# Patient Record
Sex: MALE | URBAN - METROPOLITAN AREA
[De-identification: names, ages, dates, MRNs, and addresses within clinical notes are randomized per-mention and may not be internally consistent; named-entity substitution may affect disease eponyms.]

---

## 2021-07-21 ENCOUNTER — OFFICE VISIT (OUTPATIENT)
Dept: URBAN - METROPOLITAN AREA CLINIC 71 | Facility: CLINIC | Age: 41
End: 2021-07-21

## 2021-07-21 DIAGNOSIS — H02.811 RETAINED FOREIGN BODY IN RIGHT UPPER EYELID: Primary | ICD-10-CM

## 2021-07-21 PROCEDURE — 92002 INTRM OPH EXAM NEW PATIENT: CPT | Performed by: OPHTHALMOLOGY

## 2021-07-21 RX ORDER — NEOMYCIN SULFATE, POLYMYXIN B SULFATE AND DEXAMETHASONE 3.5; 10000; 1 MG/ML; [USP'U]/ML; MG/ML
SUSPENSION/ DROPS OPHTHALMIC
Qty: 5 | Refills: 0 | Status: ACTIVE
Start: 2021-07-21

## 2021-07-21 ASSESSMENT — INTRAOCULAR PRESSURE
OS: 15
OD: 8

## 2021-07-21 NOTE — IMPRESSION/PLAN
Impression: Retained foreign body in right upper eyelid: H02.811. Removed possible metal wire from upper punctum. Plan: Discussed. Begin keyshawn-poly-dex gtts BID OD for 10 days. Contact office if no resolution.